# Patient Record
Sex: FEMALE | NOT HISPANIC OR LATINO | Employment: FULL TIME | URBAN - METROPOLITAN AREA
[De-identification: names, ages, dates, MRNs, and addresses within clinical notes are randomized per-mention and may not be internally consistent; named-entity substitution may affect disease eponyms.]

---

## 2024-07-17 ENCOUNTER — OFFICE VISIT (OUTPATIENT)
Dept: OBSTETRICS AND GYNECOLOGY | Facility: CLINIC | Age: 49
End: 2024-07-17
Payer: COMMERCIAL

## 2024-07-17 VITALS
WEIGHT: 168.25 LBS | DIASTOLIC BLOOD PRESSURE: 77 MMHG | SYSTOLIC BLOOD PRESSURE: 123 MMHG | HEART RATE: 56 BPM | BODY MASS INDEX: 28 KG/M2

## 2024-07-17 DIAGNOSIS — Z90.710 HX OF HYSTERECTOMY: ICD-10-CM

## 2024-07-17 DIAGNOSIS — N93.9 ABNORMAL VAGINAL BLEEDING: ICD-10-CM

## 2024-07-17 DIAGNOSIS — R10.2 PELVIC PAIN: Primary | ICD-10-CM

## 2024-07-17 LAB
POC APPEARANCE, URINE: CLEAR
POC BILIRUBIN, URINE: NEGATIVE
POC BLOOD, URINE: NEGATIVE
POC COLOR, URINE: YELLOW
POC GLUCOSE, URINE: NEGATIVE MG/DL
POC KETONES, URINE: NEGATIVE MG/DL
POC LEUKOCYTES, URINE: NEGATIVE
POC NITRITE,URINE: NEGATIVE
POC PH, URINE: 6 PH
POC PROTEIN, URINE: NEGATIVE MG/DL
POC SPECIFIC GRAVITY, URINE: >=1.03
POC UROBILINOGEN, URINE: 0.2 EU/DL

## 2024-07-17 PROCEDURE — 99204 OFFICE O/P NEW MOD 45 MIN: CPT

## 2024-07-17 PROCEDURE — 81003 URINALYSIS AUTO W/O SCOPE: CPT | Mod: QW

## 2024-07-17 PROCEDURE — 99214 OFFICE O/P EST MOD 30 MIN: CPT

## 2024-07-17 RX ORDER — VALACYCLOVIR HYDROCHLORIDE 500 MG/1
TABLET, FILM COATED ORAL
COMMUNITY
Start: 2017-07-27

## 2024-07-17 RX ORDER — ALPRAZOLAM 0.5 MG/1
TABLET ORAL
COMMUNITY
Start: 2019-02-20

## 2024-07-17 RX ORDER — FAMOTIDINE 40 MG/1
TABLET, FILM COATED ORAL
COMMUNITY
Start: 2019-02-20

## 2024-07-17 RX ORDER — ERYTHROMYCIN 5 MG/G
OINTMENT OPHTHALMIC
COMMUNITY

## 2024-07-17 RX ORDER — MINERAL OIL
180 ENEMA (ML) RECTAL
COMMUNITY
Start: 2024-04-30

## 2024-07-17 RX ORDER — TRIAMCINOLONE ACETONIDE 1 MG/G
CREAM TOPICAL
COMMUNITY
Start: 2023-08-28

## 2024-07-17 RX ORDER — TALC
250 POWDER (GRAM) TOPICAL
COMMUNITY
Start: 2024-04-30

## 2024-07-17 RX ORDER — FLUTICASONE PROPIONATE 50 MCG
SPRAY, SUSPENSION (ML) NASAL
COMMUNITY
Start: 2022-11-22

## 2024-07-17 RX ORDER — OMEPRAZOLE 40 MG/1
CAPSULE, DELAYED RELEASE ORAL
COMMUNITY
Start: 2018-10-14

## 2024-07-17 RX ORDER — BACLOFEN 20 MG
TABLET ORAL
COMMUNITY

## 2024-07-17 RX ORDER — TRANEXAMIC ACID 650 MG/1
TABLET ORAL
COMMUNITY
Start: 2024-04-04

## 2024-07-17 RX ORDER — OXYCODONE HYDROCHLORIDE 5 MG/1
1 TABLET ORAL EVERY 6 HOURS PRN
COMMUNITY
Start: 2024-06-15

## 2024-07-17 RX ORDER — IBUPROFEN 600 MG/1
TABLET ORAL EVERY 6 HOURS PRN
COMMUNITY
Start: 2018-06-01

## 2024-07-17 ASSESSMENT — ENCOUNTER SYMPTOMS
BACK PAIN: 1
ABDOMINAL PAIN: 1

## 2024-07-17 ASSESSMENT — PAIN SCALES - GENERAL: PAINLEVEL: 6

## 2024-07-17 NOTE — PROGRESS NOTES
"Subjective   Patient ID: Merline Gandhi is a 49 y.o. female who presents for Abdominal Pain (Pt in office for abdominal pain/LMP 5/17/2024 (hysterectomy)/Last pap 12/20/2017/Last mammogram 2022 or 2023?/Chaperone declined), Pelvic Pain, Vaginal Itching, Back Pain, and Vaginal Discharge.  Pt presents today follow up from hysterectomy performed 5/17 (outside facility). Two weeks following her procedure,  she started experiencing heavy bright red vaginal bleeding which continued intermittently x 1 month before improving.  On 6/13, while at the gym, she experienced a very heavy episode of brisk, bright red vaginal bleeding. Pt reports she lost a significant amount of blood requiring 2 units of blood. Emergency surgery was performed to explore hyster site and rule out other possible causes.  Pt was told sutures came \"undone\",  and repair was done at that time.   She was seen for follow up 7/1 and continues to experience intermittent episodes of vaginal bleeding and continuous pain since,  Was told she was a \"slow healer\" and \" not to do anyting until she has a full week of no bleeding\". Pain is is not managed by alternating tylenol and motrin, she is now taking oxycodone, which is not helping. Pain is worse when she is not bleeding.     Was seen by PCP and told to schedule this visit. Pt reports some pressure when urinating, denies NV, fever, or chills. She denies foul vaginal odor or atypical discharge.     Today is day 4 of no bleeding but pt is concerned that pain has not subsided.     Pt brought her health care records from outside facility with her today.         Abdominal Pain  Pertinent negatives include no nausea or vomiting.   Pelvic Pain  The patient's primary symptoms include pelvic pain and vaginal discharge. Associated symptoms include abdominal pain and back pain. Pertinent negatives include no nausea or vomiting.   Vaginal Itching  The patient's primary symptoms include pelvic pain and vaginal discharge. " Associated symptoms include abdominal pain and back pain. Pertinent negatives include no nausea or vomiting.   Back Pain  Associated symptoms include abdominal pain and pelvic pain.   Vaginal Discharge  The patient's primary symptoms include pelvic pain and vaginal discharge. Associated symptoms include abdominal pain and back pain. Pertinent negatives include no nausea or vomiting.       Review of Systems   Gastrointestinal:  Positive for abdominal pain. Negative for nausea and vomiting.   Endocrine: Negative for polyuria.   Genitourinary:  Positive for pelvic pain, vaginal bleeding and vaginal discharge.   Musculoskeletal:  Positive for back pain.   All other systems reviewed and are negative.      Objective   Physical Exam  Constitutional:       Appearance: Normal appearance.   Pulmonary:      Effort: Pulmonary effort is normal.   Abdominal:      General: Abdomen is flat. There is no distension.      Palpations: Abdomen is soft. There is no mass.      Tenderness: There is no abdominal tenderness. There is no guarding or rebound.   Genitourinary:     General: Normal vulva.      Exam position: Lithotomy position.      Labia:         Right: No rash, tenderness, lesion or injury.         Left: No rash, tenderness, lesion or injury.       Vagina: No signs of injury and foreign body. No vaginal discharge, erythema, tenderness, bleeding, lesions or prolapsed vaginal walls.      Uterus: Absent.       Adnexa:         Right: Tenderness present. No mass or fullness.          Left: No mass, tenderness or fullness.        Comments: Sutures visible at cuff site on SSE - area pink, and appears to be well healing. No signs of bleeding, erythema, hematoma, or drainage.   Normal vaginal discharge noted but no odor or discoloration present.   Skin:     General: Skin is warm and dry.   Neurological:      General: No focal deficit present.      Mental Status: She is alert and oriented to person, place, and time. Mental status is at  baseline.   Psychiatric:         Mood and Affect: Mood normal.         Behavior: Behavior normal.         Thought Content: Thought content normal.         Judgment: Judgment normal.     Urine dip performed in the office today with no abnormal findings.     Assessment/Plan   Diagnoses and all orders for this visit:  Pelvic pain  -     POCT UA Automated manually resulted  -     CT abdomen pelvis w IV contrast; Future  Hx of hysterectomy  Abnormal vaginal bleeding     - Reviewed normal urine dip with patient.    - Discussed patient with Dr Glynn in clinic today. Low suspicion for pelvic infection at this time given clinical presentation and ansence of concerning s/s of infection.    - Pt scheduled with Dr Ennis 7/23 for eval amd further mgt    - Will order stat CT with contrast, pt to schedule prior to visit with Dr Ennis.   Pt to bring paper medical records with her at that time    - Bleeding precautions reviewed, pt to report to ED if any new or worsening symptoms. Pt verbalized understanding.       JACKLYN Hamm 07/17/24 9:10 AM

## 2024-07-18 ENCOUNTER — HOSPITAL ENCOUNTER (OUTPATIENT)
Dept: RADIOLOGY | Facility: CLINIC | Age: 49
Discharge: HOME | End: 2024-07-18
Payer: COMMERCIAL

## 2024-07-18 DIAGNOSIS — R10.2 PELVIC PAIN: ICD-10-CM

## 2024-07-18 PROCEDURE — 74177 CT ABD & PELVIS W/CONTRAST: CPT

## 2024-07-18 PROCEDURE — 2550000001 HC RX 255 CONTRASTS

## 2024-07-18 PROCEDURE — 74177 CT ABD & PELVIS W/CONTRAST: CPT | Performed by: RADIOLOGY

## 2024-07-18 ASSESSMENT — ENCOUNTER SYMPTOMS
NAUSEA: 0
VOMITING: 0

## 2024-07-22 NOTE — PROGRESS NOTES
Division of Minimally Invasive Gynecologic Surgery  [facility]    07/23/24 Gynecology Visit    CC:   Chief Complaint   Patient presents with    Follow-up     Hyst Complication    Chaperone Declined: JOSE Jonas         Merline Gandhi is a 49 y.o. referred to our practice by Mayte Gaines    Patient had a TRH on 5/17 at [facility] (op note not available) for AUB.  On 6/14 patient reports brisk vaginal bleeding while working out, then started having vaginal bleeding to the point she needed 2 U of blood. Emergency surgery was performed at [facility] and was noted to have a cuff dehiscence, which was repaired with 0-vicryl. She has continued to have intermittent vaginal bleeding and pain. Bleeding finally stopped 1 week ago. She denies anything in the vagina    She did have an episode of acute RLQ pain that was worst for 24 hours and then slowly abated and has significantly improved.     A CT A/P was performed on 7/17 and showed no intraabdominal pathology    GI symptoms: none  Urinary symptoms: none  Sexual activity: not currently    GYN Hx  Gynecologic history:  Last pap: NILM neg HPV 2017    Pertinent PMH: ? History of PE at age 20 during childbirth. Had SOB, but declined CTA and was presumptively treated for PE. No confirmation of clot.  Pertient PSH: TLH, diagnostic laparoscopy-vaginal cuff repair    PMHx, PSHx, SHx, Allergies, and Medications updated in Epic.  History reviewed. No pertinent past medical history.  History reviewed. No pertinent surgical history.  Allergies   Allergen Reactions    Codeine Itching       Current Outpatient Medications:     ALPRAZolam (Xanax) 0.5 mg tablet, Take by mouth., Disp: , Rfl:     chlorpheniramine-phenylephrine 4-10 mg tablet, 1 tabs, ORAL, [id number], PRN PRN as needed for allergy symptoms, Refill(s) 0, Date: 4/30/24 7:19:00 AM EDT, Disp: , Rfl:     erythromycin (Romycin) 5 mg/gram (0.5 %) ophthalmic ointment, Apply to  affected eye(s)., Disp: , Rfl:     famotidine (Pepcid) 40 mg tablet, Take by mouth once daily., Disp: , Rfl:     fexofenadine (Allegra) 180 mg tablet, 1 tablet (180 mg)., Disp: , Rfl:     fluticasone (Flonase) 50 mcg/actuation nasal spray, 2 (two) each nostrils once daily (50 mcg/actuat), Disp: , Rfl:     ibuprofen (IBU) 600 mg tablet, Take by mouth every 6 hours if needed., Disp: , Rfl:     magnesium oxide (Mag-Ox) 250 mg magnesium tablet, 1 tablet (250 mg)., Disp: , Rfl:     magnesium oxide 500 mg magnesium tablet, Take by mouth once daily., Disp: , Rfl:     omeprazole (PriLOSEC) 40 mg DR capsule, Take by mouth once daily., Disp: , Rfl:     oxyCODONE (Roxicodone) 5 mg immediate release tablet, Take 1 tablet (5 mg) by mouth every 6 hours if needed for pain., Disp: , Rfl:     pediatric multivitamin-iron (Flintstones Complete) tablet chewable split tablet, 1 tabs, ORAL, DAILY, Refill(s) 0, Date: 11/22/22 1:17:00 PM EST, Disp: , Rfl:     tranexamic acid (Lysteda) 650 mg tablet tablet, PLEASE SEE ATTACHED FOR DETAILED DIRECTIONS, Disp: , Rfl:     triamcinolone (Kenalog) 0.1 % cream, 1 (one) 2-3 times daily (0.1 %), Disp: , Rfl:     valACYclovir (Valtrex) 500 mg tablet, Take by mouth once daily., Disp: , Rfl:   Social History     Socioeconomic History    Marital status: Single     Spouse name: Not on file    Number of children: Not on file    Years of education: Not on file    Highest education level: Not on file   Occupational History    Not on file   Tobacco Use    Smoking status: Never    Smokeless tobacco: Never   Substance and Sexual Activity    Alcohol use: Not Currently     Alcohol/week: 0.0 - 5.0 standard drinks of alcohol    Drug use: Yes     Types: Marijuana    Sexual activity: Not on file   Other Topics Concern    Not on file   Social History Narrative    Not on file     Social Determinants of Health     Financial Resource Strain: Not on file   Food Insecurity: No Food Insecurity (6/14/2024)    Received from  "[facility]    Hunger Vital Sign     Worried About Running Out of Food in the Last Year: Never true     Ran Out of Food in the Last Year: Never true   Transportation Needs: No Transportation Needs (6/14/2024)    Received from [facility]    PRAPARE - Transportation     Lack of Transportation (Medical): No     Lack of Transportation (Non-Medical): No   Physical Activity: Not on file   Stress: Not on file   Social Connections: Not on file   Intimate Partner Violence: Not on file   Housing Stability: Not on file     No family history on file.  OB History    No obstetric history on file.            ROS: reviewed and negative    PE:/62   Ht 1.651 m (5' 5\")   Wt 76.2 kg (168 lb)   LMP 05/17/2024 (Exact Date)   BMI 27.96 kg/m²   Gen: NAD  Psych: AAOx3  Skin: no lesions  Pulm: nonlabored breathing, normal respiratory effort  Cards: normal peripheral perfusion  Lymph: no LAD in axilla or groin  GI: soft, non-tender, non-distended, no rebound/guarding, no masses  : Pale, friable vaginal mucosa. vaginal cuff with multiple areas of granulation tissue. Central puckering with granulation tissue which was friable, but no obvious defect on digital or visual inspection      A/P: Merline Gandhi is a 49 y.o. with vaginal cuff dehiscence following TLH at outside facility     Vaginal cuff dehiscence/vaginal bleeding  - cuff intact on exam with granulation tissue.  - reviewed routine postop precautions. Given h/o cuff dehiscence, recommend 12 weeks pelvic rest  - discussed r/b of vaginal estrogen given cuff dehiscence and pale friable mucosa. Patient would like to defer given possible h/o PE  - RTC in 6 weeks for repeat cuff check    Pelvic pain  - had exacerbation of pelvic pain 1 week ago that is resolving  - discussed possible etiologies including possible ovarian cyst rupture.   - normal CT A/P  - will continue to monitor      Payal Ennis MD  Division of Minimally " Invasive Gynecologic Surgery  [facility]

## 2024-07-23 ENCOUNTER — APPOINTMENT (OUTPATIENT)
Dept: OBSTETRICS AND GYNECOLOGY | Facility: CLINIC | Age: 49
End: 2024-07-23
Payer: COMMERCIAL

## 2024-07-23 VITALS
WEIGHT: 168 LBS | BODY MASS INDEX: 27.99 KG/M2 | SYSTOLIC BLOOD PRESSURE: 114 MMHG | DIASTOLIC BLOOD PRESSURE: 62 MMHG | HEIGHT: 65 IN

## 2024-07-23 DIAGNOSIS — T81.31XD DEHISCENCE OF VAGINAL CUFF, SUBSEQUENT ENCOUNTER: ICD-10-CM

## 2024-07-23 DIAGNOSIS — R10.2 PELVIC PAIN: ICD-10-CM

## 2024-07-23 DIAGNOSIS — N93.9 VAGINAL BLEEDING: Primary | ICD-10-CM

## 2024-07-23 PROBLEM — T81.31XA DEHISCENCE OF VAGINAL CUFF: Status: ACTIVE | Noted: 2024-07-23

## 2024-07-23 PROCEDURE — 3008F BODY MASS INDEX DOCD: CPT | Performed by: STUDENT IN AN ORGANIZED HEALTH CARE EDUCATION/TRAINING PROGRAM

## 2024-07-23 PROCEDURE — 99214 OFFICE O/P EST MOD 30 MIN: CPT | Performed by: STUDENT IN AN ORGANIZED HEALTH CARE EDUCATION/TRAINING PROGRAM

## 2024-07-23 PROCEDURE — 1036F TOBACCO NON-USER: CPT | Performed by: STUDENT IN AN ORGANIZED HEALTH CARE EDUCATION/TRAINING PROGRAM

## 2024-07-23 ASSESSMENT — PAIN SCALES - GENERAL: PAINLEVEL: 1

## 2024-08-22 NOTE — PROGRESS NOTES
Division of Minimally Invasive Gynecologic Surgery  [facility]    08/22/24 Gynecology Visit    CC:   No chief complaint on file.      Merline Gandhi is a 49 y.o. referred to our practice by Mayte Gaines    Patient had a TRH on 5/17 at [facility] (op note not available) for AUB.  On 6/14 patient reports brisk vaginal bleeding while working out, then started having vaginal bleeding to the point she needed 2 U of blood. Emergency surgery was performed at [facility] and was noted to have a cuff dehiscence, which was repaired with 0-vicryl. She has continued to have intermittent vaginal bleeding and pain. Bleeding finally stopped 1 week ago. She denies anything in the vagina    She did have an episode of acute RLQ pain that was worst for 24 hours and then slowly abated and has significantly improved.     A CT A/P was performed on 7/17 and showed no intraabdominal pathology    GI symptoms: none  Urinary symptoms: none  Sexual activity: not currently    GYN Hx  Gynecologic history:  Last pap: NILM neg HPV 2017    Pertinent PMH: ? History of PE at age 20 during childbirth. Had SOB, but declined CTA and was presumptively treated for PE. No confirmation of clot.  Pertient PSH: TLH, diagnostic laparoscopy-vaginal cuff repair    PMHx, PSHx, SHx, Allergies, and Medications updated in Epic.  No past medical history on file.  No past surgical history on file.  Allergies   Allergen Reactions    Codeine Itching       Current Outpatient Medications:     ALPRAZolam (Xanax) 0.5 mg tablet, Take by mouth., Disp: , Rfl:     chlorpheniramine-phenylephrine 4-10 mg tablet, 1 tabs, ORAL, [id number], PRN PRN as needed for allergy symptoms, Refill(s) 0, Date: 4/30/24 7:19:00 AM EDT, Disp: , Rfl:     erythromycin (Romycin) 5 mg/gram (0.5 %) ophthalmic ointment, Apply to affected eye(s)., Disp: , Rfl:     famotidine (Pepcid) 40 mg tablet, Take by mouth once daily., Disp: , Rfl:     fexofenadine  (Allegra) 180 mg tablet, 1 tablet (180 mg)., Disp: , Rfl:     fluticasone (Flonase) 50 mcg/actuation nasal spray, 2 (two) each nostrils once daily (50 mcg/actuat), Disp: , Rfl:     ibuprofen (IBU) 600 mg tablet, Take by mouth every 6 hours if needed., Disp: , Rfl:     magnesium oxide (Mag-Ox) 250 mg magnesium tablet, 1 tablet (250 mg)., Disp: , Rfl:     magnesium oxide 500 mg magnesium tablet, Take by mouth once daily., Disp: , Rfl:     omeprazole (PriLOSEC) 40 mg DR capsule, Take by mouth once daily., Disp: , Rfl:     oxyCODONE (Roxicodone) 5 mg immediate release tablet, Take 1 tablet (5 mg) by mouth every 6 hours if needed for pain., Disp: , Rfl:     pediatric multivitamin-iron (Flintstones Complete) tablet chewable split tablet, 1 tabs, ORAL, DAILY, Refill(s) 0, Date: 11/22/22 1:17:00 PM EST, Disp: , Rfl:     tranexamic acid (Lysteda) 650 mg tablet tablet, PLEASE SEE ATTACHED FOR DETAILED DIRECTIONS, Disp: , Rfl:     triamcinolone (Kenalog) 0.1 % cream, 1 (one) 2-3 times daily (0.1 %), Disp: , Rfl:     valACYclovir (Valtrex) 500 mg tablet, Take by mouth once daily., Disp: , Rfl:   Social History     Socioeconomic History    Marital status: Single     Spouse name: Not on file    Number of children: Not on file    Years of education: Not on file    Highest education level: Not on file   Occupational History    Not on file   Tobacco Use    Smoking status: Never    Smokeless tobacco: Never   Substance and Sexual Activity    Alcohol use: Not Currently     Alcohol/week: 0.0 - 5.0 standard drinks of alcohol    Drug use: Yes     Types: Marijuana    Sexual activity: Not on file   Other Topics Concern    Not on file   Social History Narrative    Not on file     Social Determinants of Health     Financial Resource Strain: Not on file   Food Insecurity: No Food Insecurity (6/14/2024)    Received from [facility]    Hunger Vital Sign     Worried About Running Out of Food in the Last Year: Never true      Ran Out of Food in the Last Year: Never true   Transportation Needs: No Transportation Needs (6/14/2024)    Received from [facility]    PRAPARE - Transportation     Lack of Transportation (Medical): No     Lack of Transportation (Non-Medical): No   Physical Activity: Not on file   Stress: Not on file   Social Connections: Not on file   Intimate Partner Violence: Not on file   Housing Stability: Not on file     No family history on file.  OB History    No obstetric history on file.            ROS: reviewed and negative    PE:LMP 05/17/2024 (Exact Date)   Gen: NAD  Psych: AAOx3  Skin: no lesions  Pulm: nonlabored breathing, normal respiratory effort  Cards: normal peripheral perfusion  Lymph: no LAD in axilla or groin  GI: soft, non-tender, non-distended, no rebound/guarding, no masses  : Pale, friable vaginal mucosa. vaginal cuff with multiple areas of granulation tissue. Central puckering with granulation tissue which was friable, but no obvious defect on digital or visual inspection      A/P: Merline Gandhi is a 49 y.o. with vaginal cuff dehiscence following TLH at outside facility     Vaginal cuff dehiscence/vaginal bleeding  - cuff intact on exam with granulation tissue.  - reviewed routine postop precautions. Given h/o cuff dehiscence, recommend 12 weeks pelvic rest  - discussed r/b of vaginal estrogen given cuff dehiscence and pale friable mucosa. Patient would like to defer given possible h/o PE  - RTC in 6 weeks for repeat cuff check    Pelvic pain  - had exacerbation of pelvic pain 1 week ago that is resolving  - discussed possible etiologies including possible ovarian cyst rupture.   - normal CT A/P  - will continue to monitor      Payal Ennis MD  Division of Minimally Invasive Gynecologic Surgery  [facility]

## 2024-09-09 ENCOUNTER — APPOINTMENT (OUTPATIENT)
Dept: OBSTETRICS AND GYNECOLOGY | Facility: CLINIC | Age: 49
End: 2024-09-09
Payer: COMMERCIAL

## 2024-09-09 ENCOUNTER — OFFICE VISIT (OUTPATIENT)
Dept: OBSTETRICS AND GYNECOLOGY | Facility: CLINIC | Age: 49
End: 2024-09-09
Payer: COMMERCIAL

## 2024-09-09 VITALS
SYSTOLIC BLOOD PRESSURE: 114 MMHG | DIASTOLIC BLOOD PRESSURE: 74 MMHG | HEART RATE: 82 BPM | BODY MASS INDEX: 27.29 KG/M2 | WEIGHT: 164 LBS

## 2024-09-09 DIAGNOSIS — T81.31XD DEHISCENCE OF VAGINAL CUFF, SUBSEQUENT ENCOUNTER: Primary | ICD-10-CM

## 2024-09-09 PROCEDURE — 99213 OFFICE O/P EST LOW 20 MIN: CPT | Performed by: STUDENT IN AN ORGANIZED HEALTH CARE EDUCATION/TRAINING PROGRAM

## 2024-09-09 PROCEDURE — 1036F TOBACCO NON-USER: CPT | Performed by: STUDENT IN AN ORGANIZED HEALTH CARE EDUCATION/TRAINING PROGRAM

## 2024-09-09 NOTE — PROGRESS NOTES
Division of Minimally Invasive Gynecologic Surgery  [facility]    09/09/24 Gynecology Visit    CC:   Chief Complaint   Patient presents with    Follow-up     Pt in office for follow up  Hysterectomy  Last pap 12/20/17  Chaperone declined        Merline Gandhi is a 49 y.o. referred to our practice by Mayte Gaines    Patient had a TRH on 5/17 at [facility] (op note not available) for AUB.  On 6/14 patient reports brisk vaginal bleeding while working out, then started having vaginal bleeding to the point she needed 2 U of blood. Emergency surgery was performed at [facility] and was noted to have a partial cuff dehiscence, which was repaired with 0-vicryl vaginally. .It did not appear to be a full thickness disruption on laparoscopic evaluation per the operative report.     She continued to have intermittent bleeding which stopped ~4 weeks after vaginal repair. A CT A/P was performed on 7/17 and showed no intraabdominal pathology. She presents today for cuff check. States she is feeling much better after her last visit and denies any vaginal bleeding      GI symptoms: none  Urinary symptoms: none  Sexual activity: not currently    GYN Hx  Gynecologic history:  Last pap: NILM neg HPV 2017    Pertinent PMH: ? History of PE at age 20 during childbirth. Had SOB, but declined CTA and was presumptively treated for PE. No confirmation of clot.  Pertient PSH: TLH, diagnostic laparoscopy-vaginal cuff repair    PMHx, PSHx, SHx, Allergies, and Medications updated in Epic.  History reviewed. No pertinent past medical history.  History reviewed. No pertinent surgical history.  Allergies   Allergen Reactions    Codeine Itching       Current Outpatient Medications:     ALPRAZolam (Xanax) 0.5 mg tablet, Take by mouth., Disp: , Rfl:     chlorpheniramine-phenylephrine 4-10 mg tablet, 1 tabs, ORAL, [id number], PRN PRN as needed for allergy symptoms, Refill(s) 0, Date: 4/30/24 7:19:00 AM  EDT, Disp: , Rfl:     erythromycin (Romycin) 5 mg/gram (0.5 %) ophthalmic ointment, Apply to affected eye(s)., Disp: , Rfl:     famotidine (Pepcid) 40 mg tablet, Take by mouth once daily., Disp: , Rfl:     fexofenadine (Allegra) 180 mg tablet, 1 tablet (180 mg)., Disp: , Rfl:     fluticasone (Flonase) 50 mcg/actuation nasal spray, 2 (two) each nostrils once daily (50 mcg/actuat), Disp: , Rfl:     ibuprofen (IBU) 600 mg tablet, Take by mouth every 6 hours if needed., Disp: , Rfl:     magnesium oxide (Mag-Ox) 250 mg magnesium tablet, 1 tablet (250 mg)., Disp: , Rfl:     magnesium oxide 500 mg magnesium tablet, Take by mouth once daily., Disp: , Rfl:     omeprazole (PriLOSEC) 40 mg DR capsule, Take by mouth once daily., Disp: , Rfl:     oxyCODONE (Roxicodone) 5 mg immediate release tablet, Take 1 tablet (5 mg) by mouth every 6 hours if needed for pain., Disp: , Rfl:     pediatric multivitamin-iron (Flintstones Complete) tablet chewable split tablet, 1 tabs, ORAL, DAILY, Refill(s) 0, Date: 11/22/22 1:17:00 PM EST, Disp: , Rfl:     tranexamic acid (Lysteda) 650 mg tablet tablet, PLEASE SEE ATTACHED FOR DETAILED DIRECTIONS, Disp: , Rfl:     triamcinolone (Kenalog) 0.1 % cream, 1 (one) 2-3 times daily (0.1 %), Disp: , Rfl:     valACYclovir (Valtrex) 500 mg tablet, Take by mouth once daily., Disp: , Rfl:   Social History     Socioeconomic History    Marital status: Single     Spouse name: Not on file    Number of children: Not on file    Years of education: Not on file    Highest education level: Not on file   Occupational History    Not on file   Tobacco Use    Smoking status: Never    Smokeless tobacco: Never   Substance and Sexual Activity    Alcohol use: Not Currently     Alcohol/week: 0.0 - 5.0 standard drinks of alcohol    Drug use: Yes     Types: Marijuana    Sexual activity: Not on file   Other Topics Concern    Not on file   Social History Narrative    Not on file     Social Determinants of Health     Financial  Resource Strain: Not on file   Food Insecurity: No Food Insecurity (6/14/2024)    Received from [facility]    Hunger Vital Sign     Worried About Running Out of Food in the Last Year: Never true     Ran Out of Food in the Last Year: Never true   Transportation Needs: No Transportation Needs (6/14/2024)    Received from [facility]    PRAPARE - Transportation     Lack of Transportation (Medical): No     Lack of Transportation (Non-Medical): No   Physical Activity: Not on file   Stress: Not on file   Social Connections: Not on file   Intimate Partner Violence: Not on file   Housing Stability: Not on file     No family history on file.  OB History    No obstetric history on file.            ROS: reviewed and negative    PE:/74   Pulse 82   Wt 74.4 kg (164 lb)   LMP 05/17/2024 (Exact Date)   BMI 27.29 kg/m²   Gen: NAD  Psych: AAOx3  Skin: no lesions  Pulm: nonlabored breathing, normal respiratory effort  Cards: normal peripheral perfusion  Lymph: no LAD in axilla or groin  GI: soft, non-tender, non-distended, no rebound/guarding, no masses  :vaginal mucosa intact on visual and digital inspection. No area of puckering, friability, or vaginal bleeding      A/P: Merline Gandhi is a 49 y.o. with vaginal cuff dehiscence following TLH at outside facility, healing well    Vaginal cuff dehiscence/vaginal bleeding  - cuff intact on exam and healing well  - discussed pelvic rest for 12 weeks  - can start to increase activity as tolerated  Can follow with primary gynecologist for annual exam      Payal Ennis MD  Division of Minimally Invasive Gynecologic Surgery  [facility]